# Patient Record
Sex: FEMALE | Race: WHITE | NOT HISPANIC OR LATINO | Employment: OTHER | URBAN - METROPOLITAN AREA
[De-identification: names, ages, dates, MRNs, and addresses within clinical notes are randomized per-mention and may not be internally consistent; named-entity substitution may affect disease eponyms.]

---

## 2023-10-06 ENCOUNTER — ANCILLARY PROCEDURE (OUTPATIENT)
Dept: RADIOLOGY | Facility: CLINIC | Age: 58
End: 2023-10-06
Payer: COMMERCIAL

## 2023-10-06 DIAGNOSIS — Z12.39 ENCOUNTER FOR OTHER SCREENING FOR MALIGNANT NEOPLASM OF BREAST: ICD-10-CM

## 2023-10-06 PROCEDURE — 77067 SCR MAMMO BI INCL CAD: CPT | Mod: BILATERAL PROCEDURE | Performed by: RADIOLOGY

## 2023-10-06 PROCEDURE — 77067 SCR MAMMO BI INCL CAD: CPT | Mod: 50

## 2023-10-06 PROCEDURE — 77063 BREAST TOMOSYNTHESIS BI: CPT | Mod: BILATERAL PROCEDURE | Performed by: RADIOLOGY

## 2024-06-24 PROBLEM — I25.10 MILD CAD: Status: ACTIVE | Noted: 2024-06-24

## 2024-06-24 PROBLEM — R07.89 CHEST PAIN, MIDSTERNAL: Status: ACTIVE | Noted: 2019-05-03

## 2024-06-24 PROBLEM — R00.1 BRADYCARDIA, SINUS: Status: ACTIVE | Noted: 2024-06-24

## 2024-06-24 PROBLEM — I10 ESSENTIAL HYPERTENSION: Status: ACTIVE | Noted: 2024-06-24

## 2024-06-25 ENCOUNTER — APPOINTMENT (OUTPATIENT)
Dept: CARDIOLOGY | Facility: CLINIC | Age: 59
End: 2024-06-25
Payer: COMMERCIAL

## 2024-06-25 VITALS
HEART RATE: 54 BPM | DIASTOLIC BLOOD PRESSURE: 82 MMHG | BODY MASS INDEX: 27.64 KG/M2 | WEIGHT: 156 LBS | OXYGEN SATURATION: 96 % | HEIGHT: 63 IN | SYSTOLIC BLOOD PRESSURE: 136 MMHG

## 2024-06-25 DIAGNOSIS — I10 ESSENTIAL HYPERTENSION: Primary | ICD-10-CM

## 2024-06-25 DIAGNOSIS — R53.83 OTHER FATIGUE: ICD-10-CM

## 2024-06-25 DIAGNOSIS — R00.1 BRADYCARDIA, SINUS: ICD-10-CM

## 2024-06-25 DIAGNOSIS — I25.10 MILD CAD: ICD-10-CM

## 2024-06-25 PROCEDURE — 3075F SYST BP GE 130 - 139MM HG: CPT | Performed by: PHYSICIAN ASSISTANT

## 2024-06-25 PROCEDURE — 3079F DIAST BP 80-89 MM HG: CPT | Performed by: PHYSICIAN ASSISTANT

## 2024-06-25 PROCEDURE — 99214 OFFICE O/P EST MOD 30 MIN: CPT | Performed by: PHYSICIAN ASSISTANT

## 2024-06-25 RX ORDER — TEMAZEPAM 30 MG/1
30 CAPSULE ORAL NIGHTLY PRN
COMMUNITY

## 2024-06-25 RX ORDER — METHIMAZOLE 5 MG/1
5 TABLET ORAL DAILY
COMMUNITY

## 2024-06-25 RX ORDER — AMLODIPINE BESYLATE 10 MG/1
10 TABLET ORAL DAILY
COMMUNITY

## 2024-06-25 RX ORDER — NEBIVOLOL 5 MG/1
5 TABLET ORAL DAILY
COMMUNITY

## 2024-06-25 NOTE — PROGRESS NOTES
"Chief Complaint:   Fatigue     History Of Present Illness:    Lesvia Landry is a 59 y.o. female presenting with persistent fatigue without exertional dyspnea nor anginal symptoms.  Patient continues playing tennis at least 2-3 days per week without intolerance to rigorous matches.  She believes symptoms may be associated with underlying depression as fatigue is explained as lack of motivation and not actual activity intolerance/avoidance.  Patient denies chest pain, chest pressure, palpitations, dyspnea on exertion, shortness of breath at rest, diaphoresis, nausea/vomiting, back pain, headache, lightheadedness, dizziness, syncope or presyncopal episodes, active bleeding signs or symptoms, excessive weight gain, muscle or joint pain, claudication.       Last Recorded Vitals:  Vitals:    06/25/24 1322   BP: 136/82   BP Location: Right arm   Patient Position: Sitting   BP Cuff Size: Adult   Pulse: 54   SpO2: 96%   Weight: 70.8 kg (156 lb)   Height: 1.6 m (5' 3\")       Past Medical History:  She has a past medical history of Personal history of other diseases of the circulatory system (10/29/2019).    Past Surgical History:  She has a past surgical history that includes Breast biopsy (Left).      Social History:  She has no history on file for tobacco use, alcohol use, and drug use.    Family History:  No family history on file.     Allergies:  Patient has no known allergies.    Outpatient Medications:  Current Outpatient Medications   Medication Instructions    amLODIPine (NORVASC) 10 mg, oral, Daily    nebivolol (BYSTOLIC) 5 mg, oral, Daily    temazepam (RESTORIL) 30 mg, oral, Nightly PRN       Physical Exam:  Constitutional: awake and alert, oriented ×3, no apparent distress  Skin: warm, dry, good turgor no obvious lesions  Eyes: pupils equal, round, reactive to light, conjunctiva pink and noninjected, no discharge  HENT: normocephalic and atraumatic, mucous membranes moist, trachea midline with no " "masses/goiter  Cardiovascular: S1/S2 regular, no murmur no rubs/gallops, no carotid bruits, no JVD  Pulmonary: symmetrical chest expansion, lungs are clear to auscultation bilaterally, no wheezes/rales/rhonchi, normal effort  Abdomen: nontender, nondistended, active bowel sounds, no ascites  Extremities: no cyanosis, clubbing, no LE edema no lesions; palpable pedal pulses  Neurologic: cranial nerves II - XII grossly intact, stable gait, no tremor       Last Labs:  CBC -  Lab Results   Component Value Date    WBC 4.6 10/22/2019    HGB 14.2 10/22/2019    HCT 42.7 10/22/2019    MCV 89 10/22/2019     10/22/2019       CMP -  Lab Results   Component Value Date    CALCIUM 9.2 10/22/2019    PROT 7.1 10/22/2019    ALBUMIN 4.7 10/22/2019    AST 14 10/22/2019    ALT 21 10/22/2019    ALKPHOS 90 10/22/2019    BILITOT 0.7 10/22/2019       LIPID PANEL -   No results found for: \"CHOL\", \"TRIG\", \"HDL\", \"CHHDL\", \"LDLF\", \"VLDL\", \"NHDL\"    RENAL FUNCTION PANEL -   Lab Results   Component Value Date    GLUCOSE 102 (H) 10/22/2019     10/22/2019    K 3.9 10/22/2019     10/22/2019    CO2 28 10/22/2019    ANIONGAP 11 10/22/2019    BUN 8 10/22/2019    CREATININE 0.67 10/22/2019    CALCIUM 9.2 10/22/2019    ALBUMIN 4.7 10/22/2019        No results found for: \"BNP\", \"HGBA1C\"    Last Cardiology Tests:  ECG:  No results found for this or any previous visit from the past 1095 days.      Echo:  No results found for this or any previous visit from the past 1095 days.      Ejection Fractions:  No results found for: \"EF\"    Cath:  No results found for this or any previous visit from the past 1095 days.      Stress Test:  No results found for this or any previous visit from the past 1095 days.      Cardiac Imaging:  No results found for this or any previous visit from the past 1095 days.      Assessment/Plan   Problem List Items Addressed This Visit             ICD-10-CM       Cardiac and Vasculature    Bradycardia, sinus R00.1    " Relevant Medications    amLODIPine (Norvasc) 10 mg tablet    nebivolol (Bystolic) 5 mg tablet    Essential hypertension - Primary I10    Mild CAD I25.10    Relevant Medications    amLODIPine (Norvasc) 10 mg tablet    nebivolol (Bystolic) 5 mg tablet       Symptoms and Signs    Other fatigue R53.83       -No additional cardiovascular assessment necessary at this time    -F/U 6 months      Landon Poole PAFrankC

## 2024-10-06 DIAGNOSIS — I10 ESSENTIAL (PRIMARY) HYPERTENSION: ICD-10-CM

## 2024-10-07 DIAGNOSIS — I10 ESSENTIAL (PRIMARY) HYPERTENSION: ICD-10-CM

## 2024-10-07 RX ORDER — NEBIVOLOL 5 MG/1
5 TABLET ORAL DAILY
Qty: 90 TABLET | Refills: 3 | Status: CANCELLED | OUTPATIENT
Start: 2024-10-07

## 2024-10-07 RX ORDER — NEBIVOLOL 5 MG/1
5 TABLET ORAL DAILY
Qty: 90 TABLET | Refills: 3 | Status: SHIPPED | OUTPATIENT
Start: 2024-10-07

## 2024-10-11 DIAGNOSIS — I10 ESSENTIAL (PRIMARY) HYPERTENSION: ICD-10-CM

## 2024-10-11 DIAGNOSIS — R07.89 CHEST PAIN, MIDSTERNAL: ICD-10-CM

## 2024-10-14 DIAGNOSIS — R07.89 CHEST PAIN, MIDSTERNAL: ICD-10-CM

## 2024-10-14 DIAGNOSIS — I10 ESSENTIAL (PRIMARY) HYPERTENSION: ICD-10-CM

## 2024-10-14 RX ORDER — NEBIVOLOL 5 MG/1
TABLET ORAL
Qty: 90 TABLET | Refills: 3 | Status: SHIPPED | OUTPATIENT
Start: 2024-10-14

## 2024-10-14 RX ORDER — NEBIVOLOL HYDROCHLORIDE 5 MG/1
5 TABLET ORAL DAILY
COMMUNITY
End: 2024-10-14 | Stop reason: ALTCHOICE

## 2024-10-14 RX ORDER — NEBIVOLOL HYDROCHLORIDE 5 MG/1
5 TABLET ORAL DAILY
Qty: 30 TABLET | Refills: 11 | Status: SHIPPED | OUTPATIENT
Start: 2024-10-14 | End: 2025-10-14

## 2024-10-17 DIAGNOSIS — R07.89 OTHER CHEST PAIN: ICD-10-CM

## 2024-10-17 DIAGNOSIS — I10 ESSENTIAL HYPERTENSION: ICD-10-CM

## 2024-10-17 RX ORDER — AMLODIPINE BESYLATE 10 MG/1
10 TABLET ORAL DAILY
Qty: 90 TABLET | Refills: 2 | Status: SHIPPED | OUTPATIENT
Start: 2024-10-17

## 2024-12-04 ENCOUNTER — APPOINTMENT (OUTPATIENT)
Dept: CARDIOLOGY | Facility: CLINIC | Age: 59
End: 2024-12-04
Payer: COMMERCIAL

## 2024-12-04 ENCOUNTER — OFFICE VISIT (OUTPATIENT)
Dept: CARDIOLOGY | Facility: CLINIC | Age: 59
End: 2024-12-04
Payer: COMMERCIAL

## 2024-12-04 VITALS
HEART RATE: 52 BPM | SYSTOLIC BLOOD PRESSURE: 152 MMHG | HEIGHT: 63 IN | OXYGEN SATURATION: 98 % | WEIGHT: 152.4 LBS | DIASTOLIC BLOOD PRESSURE: 68 MMHG | BODY MASS INDEX: 27 KG/M2

## 2024-12-04 DIAGNOSIS — I10 ESSENTIAL HYPERTENSION: Primary | ICD-10-CM

## 2024-12-04 DIAGNOSIS — R00.1 BRADYCARDIA, SINUS: ICD-10-CM

## 2024-12-04 DIAGNOSIS — I25.10 ATHEROSCLEROSIS OF NATIVE CORONARY ARTERY OF NATIVE HEART WITHOUT ANGINA PECTORIS: ICD-10-CM

## 2024-12-04 PROCEDURE — 3008F BODY MASS INDEX DOCD: CPT | Performed by: PHYSICIAN ASSISTANT

## 2024-12-04 PROCEDURE — 99214 OFFICE O/P EST MOD 30 MIN: CPT | Performed by: PHYSICIAN ASSISTANT

## 2024-12-04 PROCEDURE — 3077F SYST BP >= 140 MM HG: CPT | Performed by: PHYSICIAN ASSISTANT

## 2024-12-04 PROCEDURE — 3078F DIAST BP <80 MM HG: CPT | Performed by: PHYSICIAN ASSISTANT

## 2024-12-04 RX ORDER — BISOPROLOL FUMARATE 5 MG/1
5 TABLET, FILM COATED ORAL DAILY
Qty: 90 TABLET | Refills: 3 | Status: SHIPPED | OUTPATIENT
Start: 2024-12-04 | End: 2025-12-04

## 2024-12-04 NOTE — PROGRESS NOTES
"Chief Complaint:   Follow-up (6 month/Issue with bystolic not giving generic)     History Of Present Illness:    12/4/24  Patient remains asymptomatic from a functional standpoint.  She has recently experienced difficulty with her insurance provider regarding coverage with nebivolol 5 mg daily.  In reviewing alternatives/preferred agents it does appear that bisoprolol is preferred on a higher  comparison to nebivolol.  Patient remains otherwise stable without classic anginal symptoms nor progressive exertional intolerance.    6/25/24  Lesvia Landry is a 59 y.o. female presenting with persistent fatigue without exertional dyspnea nor anginal symptoms.  Patient continues playing tennis at least 2-3 days per week without intolerance to rigorous matches.  She believes symptoms may be associated with underlying depression as fatigue is explained as lack of motivation and not actual activity intolerance/avoidance.  Patient denies chest pain, chest pressure, palpitations, dyspnea on exertion, shortness of breath at rest, diaphoresis, nausea/vomiting, back pain, headache, lightheadedness, dizziness, syncope or presyncopal episodes, active bleeding signs or symptoms, excessive weight gain, muscle or joint pain, claudication.       Last Recorded Vitals:  Vitals:    12/04/24 1320   BP: 152/68   BP Location: Left arm   Patient Position: Sitting   BP Cuff Size: Adult   Pulse: 52   SpO2: 98%   Weight: 69.1 kg (152 lb 6.4 oz)   Height: 1.6 m (5' 3\")       Past Medical History:  She has a past medical history of Personal history of other diseases of the circulatory system (10/29/2019).    Past Surgical History:  She has a past surgical history that includes Breast biopsy (Left).      Social History:  She has no history on file for tobacco use, alcohol use, and drug use.    Family History:  No family history on file.     Allergies:  Patient has no known allergies.    Outpatient Medications:  Current Outpatient Medications " "  Medication Instructions    amLODIPine (NORVASC) 10 mg, oral, Daily    bisoprolol (ZEBETA) 5 mg, oral, Daily    methIMAzole (TAPAZOLE) 5 mg, Daily    temazepam (RESTORIL) 30 mg, Nightly PRN       Physical Exam:  Constitutional: awake and alert, oriented ×3, no apparent distress  Skin: warm, dry, good turgor no obvious lesions  Eyes: pupils equal, round, reactive to light, conjunctiva pink and noninjected, no discharge  HENT: normocephalic and atraumatic, mucous membranes moist, trachea midline with no masses/goiter  Cardiovascular: S1/S2 regular, no murmur no rubs/gallops, no carotid bruits, no JVD  Pulmonary: symmetrical chest expansion, lungs are clear to auscultation bilaterally, no wheezes/rales/rhonchi, normal effort  Abdomen: nontender, nondistended, active bowel sounds, no ascites  Extremities: no cyanosis, clubbing, no LE edema no lesions; palpable pedal pulses  Neurologic: cranial nerves II - XII grossly intact, stable gait, no tremor       Last Labs:  CBC -  Lab Results   Component Value Date    WBC 4.6 10/22/2019    HGB 14.2 10/22/2019    HCT 42.7 10/22/2019    MCV 89 10/22/2019     10/22/2019       CMP -  Lab Results   Component Value Date    CALCIUM 9.2 10/22/2019    PROT 7.1 10/22/2019    ALBUMIN 4.7 10/22/2019    AST 14 10/22/2019    ALT 21 10/22/2019    ALKPHOS 90 10/22/2019    BILITOT 0.7 10/22/2019       LIPID PANEL -   No results found for: \"CHOL\", \"TRIG\", \"HDL\", \"CHHDL\", \"LDLF\", \"VLDL\", \"NHDL\"    RENAL FUNCTION PANEL -   Lab Results   Component Value Date    GLUCOSE 102 (H) 10/22/2019     10/22/2019    K 3.9 10/22/2019     10/22/2019    CO2 28 10/22/2019    ANIONGAP 11 10/22/2019    BUN 8 10/22/2019    CREATININE 0.67 10/22/2019    CALCIUM 9.2 10/22/2019    ALBUMIN 4.7 10/22/2019        No results found for: \"BNP\", \"HGBA1C\"    Last Cardiology Tests:  ECG:  No results found for this or any previous visit from the past 1095 days.      Echo:  No results found for this or any " "previous visit from the past 1095 days.      Ejection Fractions:  No results found for: \"EF\"    Cath:  No results found for this or any previous visit from the past 1095 days.      Stress Test:  No results found for this or any previous visit from the past 1095 days.      Cardiac Imaging:  No results found for this or any previous visit from the past 1095 days.      Assessment/Plan   Problem List Items Addressed This Visit             ICD-10-CM       Cardiac and Vasculature    Bradycardia, sinus R00.1    Relevant Medications    bisoprolol (Zebeta) 5 mg tablet    Essential hypertension - Primary I10    Relevant Medications    bisoprolol (Zebeta) 5 mg tablet    Atherosclerosis of coronary artery I25.10    Relevant Medications    bisoprolol (Zebeta) 5 mg tablet         -Discontinue nebivolol due to coverage issues    -We will transition patient to bisoprolol 5 mg p.o. daily    -No additional cardiovascular testing necessary at this time    -Follow-up in 6 months, sooner if issues arise in the interim      Landon Poole PA-C  "

## 2025-01-24 ENCOUNTER — HOSPITAL ENCOUNTER (OUTPATIENT)
Dept: RADIOLOGY | Facility: CLINIC | Age: 60
Discharge: HOME | End: 2025-01-24
Payer: COMMERCIAL

## 2025-01-24 VITALS — BODY MASS INDEX: 27 KG/M2 | HEIGHT: 63 IN | WEIGHT: 152.4 LBS

## 2025-01-24 DIAGNOSIS — Z12.31 ENCOUNTER FOR SCREENING MAMMOGRAM FOR MALIGNANT NEOPLASM OF BREAST: ICD-10-CM

## 2025-01-24 PROCEDURE — 77067 SCR MAMMO BI INCL CAD: CPT | Performed by: RADIOLOGY

## 2025-01-24 PROCEDURE — 77063 BREAST TOMOSYNTHESIS BI: CPT | Performed by: RADIOLOGY

## 2025-01-24 PROCEDURE — 77063 BREAST TOMOSYNTHESIS BI: CPT

## 2025-01-29 ENCOUNTER — HOSPITAL ENCOUNTER (OUTPATIENT)
Dept: RADIOLOGY | Facility: CLINIC | Age: 60
Discharge: HOME | End: 2025-01-29
Payer: COMMERCIAL

## 2025-01-29 DIAGNOSIS — R92.2 INCONCLUSIVE MAMMOGRAM: ICD-10-CM

## 2025-01-29 PROCEDURE — 76642 ULTRASOUND BREAST LIMITED: CPT | Mod: LT

## 2025-01-29 PROCEDURE — 76982 USE 1ST TARGET LESION: CPT | Mod: LT

## 2025-01-29 PROCEDURE — 77061 BREAST TOMOSYNTHESIS UNI: CPT | Mod: LT

## 2025-06-03 ENCOUNTER — APPOINTMENT (OUTPATIENT)
Dept: CARDIOLOGY | Facility: CLINIC | Age: 60
End: 2025-06-03
Payer: COMMERCIAL

## 2025-06-13 ENCOUNTER — APPOINTMENT (OUTPATIENT)
Dept: CARDIOLOGY | Facility: CLINIC | Age: 60
End: 2025-06-13
Payer: COMMERCIAL

## 2025-06-13 VITALS
OXYGEN SATURATION: 97 % | WEIGHT: 154.4 LBS | HEART RATE: 49 BPM | BODY MASS INDEX: 27.36 KG/M2 | DIASTOLIC BLOOD PRESSURE: 76 MMHG | HEIGHT: 63 IN | SYSTOLIC BLOOD PRESSURE: 142 MMHG

## 2025-06-13 DIAGNOSIS — I10 ESSENTIAL (PRIMARY) HYPERTENSION: ICD-10-CM

## 2025-06-13 DIAGNOSIS — I25.10 ATHEROSCLEROSIS OF NATIVE CORONARY ARTERY OF NATIVE HEART WITHOUT ANGINA PECTORIS: Primary | ICD-10-CM

## 2025-06-13 DIAGNOSIS — R00.1 BRADYCARDIA, SINUS: ICD-10-CM

## 2025-06-13 LAB
ATRIAL RATE: 49 BPM
P AXIS: 79 DEGREES
P OFFSET: 208 MS
P ONSET: 148 MS
PR INTERVAL: 142 MS
Q ONSET: 219 MS
QRS COUNT: 8 BEATS
QRS DURATION: 88 MS
QT INTERVAL: 456 MS
QTC CALCULATION(BAZETT): 411 MS
QTC FREDERICIA: 426 MS
R AXIS: 88 DEGREES
T AXIS: 72 DEGREES
T OFFSET: 447 MS
VENTRICULAR RATE: 49 BPM

## 2025-06-13 PROCEDURE — 3077F SYST BP >= 140 MM HG: CPT | Performed by: PHYSICIAN ASSISTANT

## 2025-06-13 PROCEDURE — 99212 OFFICE O/P EST SF 10 MIN: CPT

## 2025-06-13 PROCEDURE — 99214 OFFICE O/P EST MOD 30 MIN: CPT | Performed by: PHYSICIAN ASSISTANT

## 2025-06-13 PROCEDURE — 3008F BODY MASS INDEX DOCD: CPT | Performed by: PHYSICIAN ASSISTANT

## 2025-06-13 PROCEDURE — 3078F DIAST BP <80 MM HG: CPT | Performed by: PHYSICIAN ASSISTANT

## 2025-06-13 PROCEDURE — 93005 ELECTROCARDIOGRAM TRACING: CPT | Performed by: PHYSICIAN ASSISTANT

## 2025-06-13 NOTE — PROGRESS NOTES
"Chief Complaint:   Follow-up (6 month )     History Of Present Illness:    06/13/25  Patient denies chest pain, chest pressure, palpitations, dyspnea on exertion, shortness of breath at rest, diaphoresis, nausea/vomiting, back pain, headache, lightheadedness, dizziness, syncope or presyncopal episodes, active bleeding signs or symptoms, excessive weight gain, muscle or joint pain, claudication.    12/4/24  Patient remains asymptomatic from a functional standpoint.  She has recently experienced difficulty with her insurance provider regarding coverage with nebivolol 5 mg daily.  In reviewing alternatives/preferred agents it does appear that bisoprolol is preferred on a higher  comparison to nebivolol.  Patient remains otherwise stable without classic anginal symptoms nor progressive exertional intolerance.    6/25/24  Lesvia Landry is a 60 y.o. female presenting with persistent fatigue without exertional dyspnea nor anginal symptoms.  Patient continues playing tennis at least 2-3 days per week without intolerance to rigorous matches.  She believes symptoms may be associated with underlying depression as fatigue is explained as lack of motivation and not actual activity intolerance/avoidance.  Patient denies chest pain, chest pressure, palpitations, dyspnea on exertion, shortness of breath at rest, diaphoresis, nausea/vomiting, back pain, headache, lightheadedness, dizziness, syncope or presyncopal episodes, active bleeding signs or symptoms, excessive weight gain, muscle or joint pain, claudication.       Last Recorded Vitals:  Vitals:    06/13/25 1030   BP: 142/76   BP Location: Left arm   Patient Position: Sitting   BP Cuff Size: Adult   Pulse: (!) 49   SpO2: 97%   Weight: 70 kg (154 lb 6.4 oz)   Height: 1.6 m (5' 3\")       Past Medical History:  She has a past medical history of Personal history of other diseases of the circulatory system (10/29/2019).    Past Surgical History:  She has a past surgical " "history that includes Breast biopsy (Left).      Social History:  She has no history on file for tobacco use, alcohol use, and drug use.    Family History:  No family history on file.     Allergies:  Patient has no known allergies.    Outpatient Medications:  Current Outpatient Medications   Medication Instructions    amLODIPine (NORVASC) 10 mg, oral, Daily    bisoprolol (ZEBETA) 5 mg, oral, Daily    methIMAzole (TAPAZOLE) 5 mg, Daily    temazepam (RESTORIL) 30 mg, Nightly PRN       Physical Exam:  Constitutional: awake and alert, oriented ×3, no apparent distress  Skin: warm, dry, good turgor no obvious lesions  Eyes: pupils equal, round, reactive to light, conjunctiva pink and noninjected, no discharge  HENT: normocephalic and atraumatic, mucous membranes moist, trachea midline with no masses/goiter  Cardiovascular: S1/S2 regular, no murmur no rubs/gallops, no carotid bruits, no JVD  Pulmonary: symmetrical chest expansion, lungs are clear to auscultation bilaterally, no wheezes/rales/rhonchi, normal effort  Abdomen: nontender, nondistended, active bowel sounds, no ascites  Extremities: no cyanosis, clubbing, no LE edema no lesions; palpable pedal pulses  Neurologic: cranial nerves II - XII grossly intact, stable gait, no tremor       Last Labs:  CBC -  Lab Results   Component Value Date    WBC 4.6 10/22/2019    HGB 14.2 10/22/2019    HCT 42.7 10/22/2019    MCV 89 10/22/2019     10/22/2019       CMP -  Lab Results   Component Value Date    CALCIUM 9.2 10/22/2019    PROT 7.1 10/22/2019    ALBUMIN 4.7 10/22/2019    AST 14 10/22/2019    ALT 21 10/22/2019    ALKPHOS 90 10/22/2019    BILITOT 0.7 10/22/2019       LIPID PANEL -   No results found for: \"CHOL\", \"TRIG\", \"HDL\", \"CHHDL\", \"LDLF\", \"VLDL\", \"NHDL\"    RENAL FUNCTION PANEL -   Lab Results   Component Value Date    GLUCOSE 102 (H) 10/22/2019     10/22/2019    K 3.9 10/22/2019     10/22/2019    CO2 28 10/22/2019    ANIONGAP 11 10/22/2019    BUN 8 " "10/22/2019    CREATININE 0.67 10/22/2019    CALCIUM 9.2 10/22/2019    ALBUMIN 4.7 10/22/2019        No results found for: \"BNP\", \"HGBA1C\"    Last Cardiology Tests:  ECG:  No results found for this or any previous visit from the past 1095 days.      Echo:  No results found for this or any previous visit from the past 1095 days.      Ejection Fractions:  No results found for: \"EF\"    Cath:  No results found for this or any previous visit from the past 1095 days.      Stress Test:  No results found for this or any previous visit from the past 1095 days.      Cardiac Imaging:  No results found for this or any previous visit from the past 1095 days.      Assessment/Plan   Problem List Items Addressed This Visit           ICD-10-CM       Cardiac and Vasculature    Bradycardia, sinus R00.1    Atherosclerosis of coronary artery - Primary I25.10    Relevant Orders    Lipoprotein a    Essential (primary) hypertension I10    Relevant Orders    ECG 12 lead (Clinic Performed)    CBC    Comprehensive metabolic panel    Lipid panel         -Continue bisoprolol and amlodipine as prescribed    -No additional cardiovascular testing necessary at this time    -Repeat CBC, CMP, fasting lipids, Lp (a)    -Follow-up in 6 months, sooner if issues arise in the interim      Landon Poole PA-C  "
